# Patient Record
Sex: FEMALE | Race: WHITE | NOT HISPANIC OR LATINO
[De-identification: names, ages, dates, MRNs, and addresses within clinical notes are randomized per-mention and may not be internally consistent; named-entity substitution may affect disease eponyms.]

---

## 2023-06-21 PROBLEM — Z00.00 ENCOUNTER FOR PREVENTIVE HEALTH EXAMINATION: Status: ACTIVE | Noted: 2023-06-21

## 2023-06-27 ENCOUNTER — APPOINTMENT (OUTPATIENT)
Dept: NEUROSURGERY | Facility: CLINIC | Age: 43
End: 2023-06-27
Payer: SELF-PAY

## 2023-06-27 PROCEDURE — EDU01: CPT

## 2023-06-29 NOTE — HISTORY OF PRESENT ILLNESS
[de-identified] : Ms. TOBAR is a pleasant 43 year old female who presents today with a chief complaint of RIGHT ear pulsatile tinnitus.  It first started in Fall of 2022.  Since that time it has been getting slightly better.  It is described as a constant, whooshing sound that is in sync with her pulse.  Pressing on the bone behind her right ear stops the sound.  There is no change with ipsilateral neck pressure\par \par She had an LP last month with an opening pressure of 18cm H2O.\par \par She denies headaches, blurry or double vision.  She had a normal eye exam in the last couple of months.  \par \par She has been seen at [facility] and worked up for IIH as above.  She was offered stenting for pulsatile tinnitus and is here for a second opinion.

## 2023-06-29 NOTE — REASON FOR VISIT
[Home] : at home, [unfilled] , at the time of the educational consult. [Medical Office: ([facility])___] : at the medical office located in  [Participant] : the participant [Self] : self [New Patient Visit] : a new patient visit [FreeTextEntry1] : Pulsatile Tinnitus

## 2023-06-29 NOTE — ASSESSMENT
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  She has not had an MRA to r/o dural AVF, which there is concern for based on resolution of the sound with occipital artery pressure.  I recommend MRA Head to r/o dural AVF before making final recommendations.  \par \par Recommendations:\par MRA Head w/wo TRICKS PROTOCOL\par Follow Up with Me After the Above

## 2023-07-11 ENCOUNTER — TRANSCRIPTION ENCOUNTER (OUTPATIENT)
Age: 43
End: 2023-07-11

## 2023-08-31 ENCOUNTER — NON-APPOINTMENT (OUTPATIENT)
Age: 43
End: 2023-08-31

## 2023-08-31 DIAGNOSIS — H93.A9 PULSATILE TINNITUS, UNSPECIFIED EAR: ICD-10-CM

## 2023-08-31 NOTE — ASSESSMENT
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  The risks, benefits and alternatives of endovascular treatment were discussed with the patient in detail. In my personal experience, the risks are very rare, but the possibility is not zero. Risks include stroke, brain hemorrhage, any type of disability (facial or extremity weakness, facial or extremity numbness, speech difficulties, blindness) and death. There are also possible complications related to the incisions such as infection, pain, swelling and bleeding.   Recommendations: Schedule Dural AV Fistula Embolization  **Dr. Gordon reviewed the imaging and discussed results and recommendations with the patient via video call**

## 2023-08-31 NOTE — HISTORY OF PRESENT ILLNESS
[de-identified] : Ms. TOBAR is a pleasant 43 year old female who presents today for follow up of RIGHT ear pulsatile tinnitus and MRA review.  It first started in Fall of 2022.  Since that time it has been getting slightly better.  It is described as a constant, whooshing sound that is in sync with her pulse.  Pressing on the bone behind her right ear stops the sound.  There is no change with ipsilateral neck pressure  She had an LP last month with an opening pressure of 18cm H2O.  She denies headaches, blurry or double vision.  She had a normal eye exam in the last couple of months.    She has been seen at [facility] and worked up for IIH as above.  She was offered stenting for pulsatile tinnitus and is here for a second opinion.

## 2023-10-23 ENCOUNTER — APPOINTMENT (OUTPATIENT)
Dept: NEUROSURGERY | Facility: HOSPITAL | Age: 43
End: 2023-10-23

## 2023-11-06 ENCOUNTER — NON-APPOINTMENT (OUTPATIENT)
Age: 43
End: 2023-11-06

## 2023-11-07 ENCOUNTER — APPOINTMENT (OUTPATIENT)
Dept: NEUROSURGERY | Facility: CLINIC | Age: 43
End: 2023-11-07
Payer: COMMERCIAL

## 2023-11-07 VITALS
SYSTOLIC BLOOD PRESSURE: 110 MMHG | BODY MASS INDEX: 29.89 KG/M2 | HEART RATE: 98 BPM | HEIGHT: 66 IN | DIASTOLIC BLOOD PRESSURE: 78 MMHG | OXYGEN SATURATION: 98 % | WEIGHT: 186 LBS

## 2023-11-07 DIAGNOSIS — Z86.59 PERSONAL HISTORY OF OTHER MENTAL AND BEHAVIORAL DISORDERS: ICD-10-CM

## 2023-11-07 PROCEDURE — 99203 OFFICE O/P NEW LOW 30 MIN: CPT

## 2023-11-07 RX ORDER — CLONAZEPAM 2 MG/1
TABLET ORAL
Refills: 0 | Status: ACTIVE | COMMUNITY

## 2023-11-07 RX ORDER — MULTIVITAMIN
TABLET ORAL
Refills: 0 | Status: ACTIVE | COMMUNITY

## 2023-11-07 RX ORDER — SERTRALINE HYDROCHLORIDE 25 MG/1
TABLET, FILM COATED ORAL
Refills: 0 | Status: ACTIVE | COMMUNITY

## 2023-11-08 ENCOUNTER — INPATIENT (INPATIENT)
Facility: HOSPITAL | Age: 43
LOS: 0 days | Discharge: HOME | DRG: 27 | End: 2023-11-09
Attending: RADIOLOGY | Admitting: RADIOLOGY
Payer: COMMERCIAL

## 2023-11-08 ENCOUNTER — APPOINTMENT (OUTPATIENT)
Dept: NEUROSURGERY | Facility: HOSPITAL | Age: 43
End: 2023-11-08

## 2023-11-08 VITALS
WEIGHT: 186.07 LBS | DIASTOLIC BLOOD PRESSURE: 71 MMHG | TEMPERATURE: 98 F | RESPIRATION RATE: 16 BRPM | SYSTOLIC BLOOD PRESSURE: 104 MMHG | HEART RATE: 96 BPM | HEIGHT: 66 IN | OXYGEN SATURATION: 97 %

## 2023-11-08 DIAGNOSIS — H93.A9 PULSATILE TINNITUS, UNSPECIFIED EAR: ICD-10-CM

## 2023-11-08 LAB
ANION GAP SERPL CALC-SCNC: 13 MMOL/L — SIGNIFICANT CHANGE UP (ref 5–17)
ANION GAP SERPL CALC-SCNC: 13 MMOL/L — SIGNIFICANT CHANGE UP (ref 5–17)
BUN SERPL-MCNC: 12 MG/DL — SIGNIFICANT CHANGE UP (ref 7–23)
BUN SERPL-MCNC: 12 MG/DL — SIGNIFICANT CHANGE UP (ref 7–23)
CALCIUM SERPL-MCNC: 7.3 MG/DL — LOW (ref 8.4–10.5)
CALCIUM SERPL-MCNC: 7.3 MG/DL — LOW (ref 8.4–10.5)
CHLORIDE SERPL-SCNC: 111 MMOL/L — HIGH (ref 96–108)
CHLORIDE SERPL-SCNC: 111 MMOL/L — HIGH (ref 96–108)
CO2 SERPL-SCNC: 17 MMOL/L — LOW (ref 22–31)
CO2 SERPL-SCNC: 17 MMOL/L — LOW (ref 22–31)
CREAT SERPL-MCNC: 0.55 MG/DL — SIGNIFICANT CHANGE UP (ref 0.5–1.3)
CREAT SERPL-MCNC: 0.55 MG/DL — SIGNIFICANT CHANGE UP (ref 0.5–1.3)
EGFR: 116 ML/MIN/1.73M2 — SIGNIFICANT CHANGE UP
EGFR: 116 ML/MIN/1.73M2 — SIGNIFICANT CHANGE UP
GLUCOSE SERPL-MCNC: 98 MG/DL — SIGNIFICANT CHANGE UP (ref 70–99)
GLUCOSE SERPL-MCNC: 98 MG/DL — SIGNIFICANT CHANGE UP (ref 70–99)
HCT VFR BLD CALC: 42.7 % — SIGNIFICANT CHANGE UP (ref 34.5–45)
HCT VFR BLD CALC: 42.7 % — SIGNIFICANT CHANGE UP (ref 34.5–45)
HGB BLD-MCNC: 14 G/DL — SIGNIFICANT CHANGE UP (ref 11.5–15.5)
HGB BLD-MCNC: 14 G/DL — SIGNIFICANT CHANGE UP (ref 11.5–15.5)
MAGNESIUM SERPL-MCNC: 1.9 MG/DL — SIGNIFICANT CHANGE UP (ref 1.6–2.6)
MAGNESIUM SERPL-MCNC: 1.9 MG/DL — SIGNIFICANT CHANGE UP (ref 1.6–2.6)
MCHC RBC-ENTMCNC: 30 PG — SIGNIFICANT CHANGE UP (ref 27–34)
MCHC RBC-ENTMCNC: 30 PG — SIGNIFICANT CHANGE UP (ref 27–34)
MCHC RBC-ENTMCNC: 32.8 GM/DL — SIGNIFICANT CHANGE UP (ref 32–36)
MCHC RBC-ENTMCNC: 32.8 GM/DL — SIGNIFICANT CHANGE UP (ref 32–36)
MCV RBC AUTO: 91.6 FL — SIGNIFICANT CHANGE UP (ref 80–100)
MCV RBC AUTO: 91.6 FL — SIGNIFICANT CHANGE UP (ref 80–100)
NRBC # BLD: 0 /100 WBCS — SIGNIFICANT CHANGE UP (ref 0–0)
NRBC # BLD: 0 /100 WBCS — SIGNIFICANT CHANGE UP (ref 0–0)
PHOSPHATE SERPL-MCNC: 2.4 MG/DL — LOW (ref 2.5–4.5)
PHOSPHATE SERPL-MCNC: 2.4 MG/DL — LOW (ref 2.5–4.5)
PLATELET # BLD AUTO: 289 K/UL — SIGNIFICANT CHANGE UP (ref 150–400)
PLATELET # BLD AUTO: 289 K/UL — SIGNIFICANT CHANGE UP (ref 150–400)
POTASSIUM SERPL-MCNC: 3.9 MMOL/L — SIGNIFICANT CHANGE UP (ref 3.5–5.3)
POTASSIUM SERPL-MCNC: 3.9 MMOL/L — SIGNIFICANT CHANGE UP (ref 3.5–5.3)
POTASSIUM SERPL-SCNC: 3.9 MMOL/L — SIGNIFICANT CHANGE UP (ref 3.5–5.3)
POTASSIUM SERPL-SCNC: 3.9 MMOL/L — SIGNIFICANT CHANGE UP (ref 3.5–5.3)
RBC # BLD: 4.66 M/UL — SIGNIFICANT CHANGE UP (ref 3.8–5.2)
RBC # BLD: 4.66 M/UL — SIGNIFICANT CHANGE UP (ref 3.8–5.2)
RBC # FLD: 12.3 % — SIGNIFICANT CHANGE UP (ref 10.3–14.5)
RBC # FLD: 12.3 % — SIGNIFICANT CHANGE UP (ref 10.3–14.5)
SODIUM SERPL-SCNC: 141 MMOL/L — SIGNIFICANT CHANGE UP (ref 135–145)
SODIUM SERPL-SCNC: 141 MMOL/L — SIGNIFICANT CHANGE UP (ref 135–145)
WBC # BLD: 12.67 K/UL — HIGH (ref 3.8–10.5)
WBC # BLD: 12.67 K/UL — HIGH (ref 3.8–10.5)
WBC # FLD AUTO: 12.67 K/UL — HIGH (ref 3.8–10.5)
WBC # FLD AUTO: 12.67 K/UL — HIGH (ref 3.8–10.5)

## 2023-11-08 PROCEDURE — 36227 PLACE CATH XTRNL CAROTID: CPT | Mod: 50

## 2023-11-08 PROCEDURE — 61624 TCAT PERM OCCLS/EMBOLJ CNS: CPT

## 2023-11-08 PROCEDURE — 36224 PLACE CATH CAROTD ART: CPT | Mod: 26

## 2023-11-08 PROCEDURE — 36226 PLACE CATH VERTEBRAL ART: CPT | Mod: RT

## 2023-11-08 PROCEDURE — 93970 EXTREMITY STUDY: CPT | Mod: 26

## 2023-11-08 PROCEDURE — 99232 SBSQ HOSP IP/OBS MODERATE 35: CPT

## 2023-11-08 PROCEDURE — 75898 FOLLOW-UP ANGIOGRAPHY: CPT | Mod: 26

## 2023-11-08 PROCEDURE — 75894 X-RAYS TRANSCATH THERAPY: CPT | Mod: 26

## 2023-11-08 RX ORDER — SODIUM CHLORIDE 9 MG/ML
500 INJECTION INTRAMUSCULAR; INTRAVENOUS; SUBCUTANEOUS ONCE
Refills: 0 | Status: COMPLETED | OUTPATIENT
Start: 2023-11-08 | End: 2023-11-08

## 2023-11-08 RX ORDER — FAMOTIDINE 10 MG/ML
20 INJECTION INTRAVENOUS DAILY
Refills: 0 | Status: DISCONTINUED | OUTPATIENT
Start: 2023-11-08 | End: 2023-11-09

## 2023-11-08 RX ORDER — MAGNESIUM SULFATE 500 MG/ML
1 VIAL (ML) INJECTION ONCE
Refills: 0 | Status: COMPLETED | OUTPATIENT
Start: 2023-11-08 | End: 2023-11-08

## 2023-11-08 RX ORDER — SODIUM,POTASSIUM PHOSPHATES 278-250MG
1 POWDER IN PACKET (EA) ORAL ONCE
Refills: 0 | Status: COMPLETED | OUTPATIENT
Start: 2023-11-08 | End: 2023-11-08

## 2023-11-08 RX ORDER — SODIUM CHLORIDE 9 MG/ML
1000 INJECTION INTRAMUSCULAR; INTRAVENOUS; SUBCUTANEOUS
Refills: 0 | Status: DISCONTINUED | OUTPATIENT
Start: 2023-11-08 | End: 2023-11-08

## 2023-11-08 RX ORDER — POTASSIUM CHLORIDE 20 MEQ
40 PACKET (EA) ORAL ONCE
Refills: 0 | Status: COMPLETED | OUTPATIENT
Start: 2023-11-08 | End: 2023-11-08

## 2023-11-08 RX ORDER — ASPIRIN/CALCIUM CARB/MAGNESIUM 324 MG
81 TABLET ORAL DAILY
Refills: 0 | Status: COMPLETED | OUTPATIENT
Start: 2023-11-08 | End: 2023-11-08

## 2023-11-08 RX ORDER — MAGNESIUM SULFATE 500 MG/ML
1 VIAL (ML) INJECTION ONCE
Refills: 0 | Status: COMPLETED | OUTPATIENT
Start: 2023-11-08 | End: 2023-11-09

## 2023-11-08 RX ORDER — ASPIRIN/CALCIUM CARB/MAGNESIUM 324 MG
1 TABLET ORAL
Refills: 0 | DISCHARGE

## 2023-11-08 RX ORDER — CHLORHEXIDINE GLUCONATE 213 G/1000ML
1 SOLUTION TOPICAL DAILY
Refills: 0 | Status: DISCONTINUED | OUTPATIENT
Start: 2023-11-08 | End: 2023-11-09

## 2023-11-08 RX ORDER — ACETAMINOPHEN 500 MG
650 TABLET ORAL EVERY 6 HOURS
Refills: 0 | Status: DISCONTINUED | OUTPATIENT
Start: 2023-11-08 | End: 2023-11-09

## 2023-11-08 RX ORDER — SENNA PLUS 8.6 MG/1
2 TABLET ORAL AT BEDTIME
Refills: 0 | Status: DISCONTINUED | OUTPATIENT
Start: 2023-11-08 | End: 2023-11-09

## 2023-11-08 RX ORDER — ESCITALOPRAM OXALATE 10 MG/1
10 TABLET, FILM COATED ORAL DAILY
Refills: 0 | Status: DISCONTINUED | OUTPATIENT
Start: 2023-11-08 | End: 2023-11-09

## 2023-11-08 RX ORDER — POLYETHYLENE GLYCOL 3350 17 G/17G
17 POWDER, FOR SOLUTION ORAL AT BEDTIME
Refills: 0 | Status: DISCONTINUED | OUTPATIENT
Start: 2023-11-08 | End: 2023-11-09

## 2023-11-08 RX ORDER — CALCIUM GLUCONATE 100 MG/ML
2 VIAL (ML) INTRAVENOUS ONCE
Refills: 0 | Status: COMPLETED | OUTPATIENT
Start: 2023-11-08 | End: 2023-11-08

## 2023-11-08 RX ORDER — DEXAMETHASONE 0.5 MG/5ML
4 ELIXIR ORAL EVERY 6 HOURS
Refills: 0 | Status: DISCONTINUED | OUTPATIENT
Start: 2023-11-08 | End: 2023-11-09

## 2023-11-08 RX ORDER — ESCITALOPRAM OXALATE 10 MG/1
1 TABLET, FILM COATED ORAL
Refills: 0 | DISCHARGE

## 2023-11-08 RX ORDER — POTASSIUM PHOSPHATE, MONOBASIC POTASSIUM PHOSPHATE, DIBASIC 236; 224 MG/ML; MG/ML
30 INJECTION, SOLUTION INTRAVENOUS ONCE
Refills: 0 | Status: COMPLETED | OUTPATIENT
Start: 2023-11-08 | End: 2023-11-09

## 2023-11-08 RX ORDER — ASPIRIN/CALCIUM CARB/MAGNESIUM 324 MG
81 TABLET ORAL
Refills: 0 | Status: DISCONTINUED | OUTPATIENT
Start: 2023-11-09 | End: 2023-11-09

## 2023-11-08 RX ORDER — ASPIRIN/CALCIUM CARB/MAGNESIUM 324 MG
81 TABLET ORAL DAILY
Refills: 0 | Status: DISCONTINUED | OUTPATIENT
Start: 2023-11-08 | End: 2023-11-08

## 2023-11-08 RX ORDER — OXYCODONE HYDROCHLORIDE 5 MG/1
10 TABLET ORAL EVERY 4 HOURS
Refills: 0 | Status: DISCONTINUED | OUTPATIENT
Start: 2023-11-08 | End: 2023-11-09

## 2023-11-08 RX ORDER — OXYCODONE HYDROCHLORIDE 5 MG/1
5 TABLET ORAL EVERY 4 HOURS
Refills: 0 | Status: DISCONTINUED | OUTPATIENT
Start: 2023-11-08 | End: 2023-11-09

## 2023-11-08 RX ADMIN — SODIUM CHLORIDE 70 MILLILITER(S): 9 INJECTION INTRAMUSCULAR; INTRAVENOUS; SUBCUTANEOUS at 12:56

## 2023-11-08 RX ADMIN — Medication 81 MILLIGRAM(S): at 13:51

## 2023-11-08 RX ADMIN — Medication 102 GRAM(S): at 15:55

## 2023-11-08 RX ADMIN — OXYCODONE HYDROCHLORIDE 5 MILLIGRAM(S): 5 TABLET ORAL at 14:21

## 2023-11-08 RX ADMIN — SENNA PLUS 2 TABLET(S): 8.6 TABLET ORAL at 22:17

## 2023-11-08 RX ADMIN — ESCITALOPRAM OXALATE 10 MILLIGRAM(S): 10 TABLET, FILM COATED ORAL at 19:52

## 2023-11-08 RX ADMIN — Medication 1 PACKET(S): at 15:39

## 2023-11-08 RX ADMIN — Medication 4 MILLIGRAM(S): at 23:47

## 2023-11-08 RX ADMIN — OXYCODONE HYDROCHLORIDE 5 MILLIGRAM(S): 5 TABLET ORAL at 13:51

## 2023-11-08 RX ADMIN — SODIUM CHLORIDE 1000 MILLILITER(S): 9 INJECTION INTRAMUSCULAR; INTRAVENOUS; SUBCUTANEOUS at 12:56

## 2023-11-08 RX ADMIN — OXYCODONE HYDROCHLORIDE 5 MILLIGRAM(S): 5 TABLET ORAL at 19:50

## 2023-11-08 RX ADMIN — Medication 4 MILLIGRAM(S): at 17:21

## 2023-11-08 RX ADMIN — Medication 40 MILLIEQUIVALENT(S): at 23:56

## 2023-11-08 RX ADMIN — POLYETHYLENE GLYCOL 3350 17 GRAM(S): 17 POWDER, FOR SOLUTION ORAL at 22:17

## 2023-11-08 RX ADMIN — OXYCODONE HYDROCHLORIDE 5 MILLIGRAM(S): 5 TABLET ORAL at 20:20

## 2023-11-08 RX ADMIN — Medication 200 GRAM(S): at 23:49

## 2023-11-08 NOTE — CHART NOTE - NSCHARTNOTEFT_GEN_A_CORE
Interventional Neuro Radiology  Pre-Procedure Note PA-C    This is a 43 year old right hand dominant female            Allergies:   PMHX:  PSHX:   Social History:   FAMILY HISTORY:  Current Medications:     Labs:                   Blood Bank:       Assessment/Plan:   This is a 43 year old right hand dominant female  presents with   Patient presents to neuro-IR for selective cerebral angiography.   Procedure, goals, risks, benefits and alternatives  were discussed with patient and patient's . (Buddy) All questions were answered. Risks include but are not limited to stroke, vessel injury, hemorrhage, and or right groin hematoma. Patient demonstrates understanding of all risks involved with this procedure and wishes to continue. Appropriate consent was obtained from patient and consent is in the patient's chart. Interventional Neuro Radiology  Pre-Procedure Note PA-C    This is a 43 year old right hand dominant female            Allergies:   PMHX: left ankle fracture  PSHX:   Social History:   FAMILY HISTORY:  Current Medications:     CBC:          12.8  8.83  37.2   347    141  103   15   4.3   22    0.71  113      Blood Bank:       Assessment/Plan:   This is a 43 year old right hand dominant female   Patient presents to neuro-IR for selective cerebral angiography.   Procedure, goals, risks, benefits and alternatives  were discussed with patient and patient's . (Buddy) All questions were answered. Risks include but are not limited to stroke, vessel injury, hemorrhage, and or right groin hematoma. Patient demonstrates understanding of all risks involved with this procedure and wishes to continue. Appropriate consent was obtained from patient and consent is in the patient's chart. Interventional Neuro Radiology  Pre-Procedure Note PA-C    This is a 43 year old right hand dominant female with complaints of right ear tinnitus, who sought Dr Gordon. MRA/MRV revealed a dural fistula. Patient presents to Neuro IR for a catheter cerebral venogram, angiogram, and embolization of fistula, accompanied by her .     Upon exam patient is A + O x 3 , speech is fluent, recent and remote memory intact, follows commands, motor moves with good strength, fracture of left ankle for 1 month, patient ambulates with boot.     Allergies: NKDA   PMHX: Right ear tinnitus. left ankle fracture  PSHX:   Social History: , non-tobacco smoker   FAMILY HISTORY: Non-contributory   Current Medications: ASA 81mg     CBC:          12.8  8.83  37.2   347    141  103   15   4.3   22    0.71  113      Blood Bank: O negative available     Assessment/Plan:   This is a 43 year old right hand dominant female with a dural fistula, who presents to Neuro IR for a catheter cerebral venogram and angiogram, and embolization of dural fistula. Procedure, goals, risks, benefits and alternatives were discussed with patient and patient's . (Buddy) All questions were answered. Risks include but are not limited to stroke, vessel injury, hemorrhage, and or right groin hematoma. Patient demonstrates understanding of all risks involved with this procedure and wishes to continue. Appropriate consent was obtained from patient and consent is in the patient's chart.

## 2023-11-08 NOTE — CHART NOTE - NSCHARTNOTEFT_GEN_A_CORE
CAPRINI SCORE [CLOT] Score on Admission for     AGE RELATED RISK FACTORS                                                       MOBILITY RELATED FACTORS  [x] Age 41-60 years                                            (1 Point)                  [ ] Bed rest                                                        (1 Point)  [ ] Age: 61-74 years                                           (2 Points)                 [ ] Plaster cast                                                   (2 Points)  [ ] Age= 75 years                                              (3 Points)                 [ ] Bed bound for more than 72 hours                 (2 Points)    DISEASE RELATED RISK FACTORS                                               GENDER SPECIFIC FACTORS  [ ] Edema in the lower extremities                       (1 Point)                  [ ] Pregnancy                                                     (1 Point)  [ ] Varicose veins                                               (1 Point)                  [ ] Post-partum < 6 weeks                                   (1 Point)             [x ] BMI > 25 Kg/m2                                            (1 Point)                  [ ] Hormonal therapy  or oral contraception          (1 Point)                 [ ] Sepsis (in the previous month)                        (1 Point)                  [ ] History of pregnancy complications                 (1 point)  [ ] Pneumonia or serious lung disease                                               [ ] Unexplained or recurrent                     (1 Point)           (in the previous month)                               (1 Point)  [ ] Abnormal pulmonary function test                     (1 Point)                 SURGERY RELATED RISK FACTORS (include planned surgeries)  [ ] Acute myocardial infarction                              (1 Point)                 [ ]  Section                                             (1 Point)  [ ] Congestive heart failure (in the previous month)  (1 Point)         [ ] Minor surgery                                                  (1 Point)   [ ] Inflammatory bowel disease                             (1 Point)                 [ ] Arthroscopic surgery                                        (2 Points)  [ ] Central venous access                                      (2 Points)                [ x] General surgery lasting more than 45 minutes   (2 Points)       [ ] Stroke (in the previous month)                          (5 Points)               [ ] Elective arthroplasty                                         (5 Points)            [ ] current or past malignancy                              (2 Points)                                                                                                       HEMATOLOGY RELATED FACTORS                                                 TRAUMA RELATED RISK FACTORS  [ ] Prior episodes of VTE                                     (3 Points)                [ ] Fracture of the hip, pelvis, or leg                       (5 Points)  [ ] Positive family history for VTE                         (3 Points)                 [ ] Acute spinal cord injury (in the previous month)  (5 Points)  [ ] Prothrombin 05433 A                                     (3 Points)                 [ ] Paralysis  (less than 1 month)                             (5 Points)  [ ] Factor V Leiden                                             (3 Points)                  [ ] Multiple Trauma within 1 month                        (5 Points)  [ ] Lupus anticoagulants                                     (3 Points)                                                           [ ] Anticardiolipin antibodies                               (3 Points)                                                       [ ] High homocysteine in the blood                      (3 Points)                                             [ ] Other congenital or acquired thrombophilia      (3 Points)                                                [ ] Heparin induced thrombocytopenia                  (3 Points)                                          Total Score [    4      ]    Risk:  Very low 0   Low 1 to 2   Moderate 3 to 4   High =5       VTE Prophylasix Recommednations:  [ x] mechanical pneumatic compression devices                                      [ ] contraindicated: _____________________  [ ] chemo prophylasix                                                                                   [ ] contraindicated _____________________    **** HIGH LIKELIHOOD DVT PRESENT ON ADMISSION  [ ] (please order LE dopplers within 24 hours of admission)

## 2023-11-08 NOTE — PRE-ANESTHESIA EVALUATION ADULT - NSANTHADDINFOFT_GEN_ALL_CORE
Risks and benefits of anesthesia discussed with patient - including nausea/vomiting, sore throat, dental injury, and cardiopulmonary complications. All questions were answered.

## 2023-11-08 NOTE — PROGRESS NOTE ADULT - ASSESSMENT
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 PT tolerated procedure well, was hemodynamically stable and had no change in neurological status. PT admitted to NSCU for neurologic monitoring. PT doing well, no focal deficits, C/O 6-7/10 right-sided headache.    NEURO:  Q1h neuro checks  Assess femoral incision site  Keep patient flat for 4 hours  Dexamethasone 4mg q6h  Acetaminophen PO for pain control  Oxycodone IR for pain control PRN for moderate or severe pain    CV:  Not on pressors, normotensive    PULM:  On RA  Incentive spirometry    GI:  Normal diet once PT able to sit up, passes dysphagia screen  Bowel regimen w/ Miralax & Senna    :  mIVF @70cc/hr - D/C once patient able to sit up  D/C santillan once patient able to sit up    HEME:  Aspirin 81mg PO qDay  SCDs    ENDOCRINE:  Euglycemic    PSYCH:  Initiate home Escitalopram    ICU:  Head of bed elevated after 4h  Pain control as per above    DISPO:  Home tomorrow    HEME/ONC:  VTE prophylaxis: [x] SCDs [] chemoprophylaxis [x] hold chemoprophylaxis due to: due to post op day 0    ID:  Afebrile  No ABX requested by surgeon    SOCIAL/FAMILY:  [] awaiting [x] updated at bedside [] family meeting    CODE STATUS:  [x] Full Code [] DNR [] DNI [] Palliative/Comfort Care    DISPOSITION:  [x] ICU [] Stroke Unit [] FLOOR [] EMU [] RCU [] PCU   42 YO 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 PT tolerated procedure well, was hemodynamically stable and had no change in neurological status. PT admitted to NSCU for neurologic monitoring. PT doing well, no focal deficits, C/O 6-7/10 right-sided headache.    NEURO:  Q1h neuro checks  Assess femoral incision site  Dexamethasone 4mg q6h  Acetaminophen PO for pain control  Oxycodone IR for pain control PRN for moderate or severe pain  PT to see    CV:  -140  A line     PULM:  On RA  Incentive spirometry    GI:  Normal diet once PT able to sit up, passes dysphagia screen  Bowel regimen w/ Miralax & Senna    :  IVL  D/C santillan once patient able to sit up    HEME:  Aspirin 81mg PO qDay  SCDs    ENDOCRINE:  Euglycemic    PSYCH:  Initiate home Escitalopram    ICU:  Head of bed elevated after 4h  Pain control as per above    DISPO:  Home tomorrow    HEME/ONC:  VTE prophylaxis: [x] SCDs [] chemoprophylaxis [x] hold chemoprophylaxis due to: due to post op day 0    ID:  Afebrile  No ABX requested by surgeon    SOCIAL/FAMILY:  [] awaiting [x] updated at bedside [] family meeting    CODE STATUS:  [x] Full Code [] DNR [] DNI [] Palliative/Comfort Care    DISPOSITION:  [x] ICU [] Stroke Unit [] FLOOR [] EMU [] RCU [] PCU

## 2023-11-08 NOTE — PATIENT PROFILE ADULT - FALL HARM RISK - RISK INTERVENTIONS

## 2023-11-08 NOTE — PROGRESS NOTE ADULT - ASSESSMENT
42 YO 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 PT tolerated procedure well, was hemodynamically stable and had no change in neurological status. PT admitted to NSCU for neurologic monitoring. PT doing well, no focal deficits, C/O 6-7/10 right-sided headache.    NEURO:  Q1h neuro checks  Assess femoral incision site  Keep patient flat for 4 hours  Dexamethasone 4mg q6h  Acetaminophen PO for pain control  Oxycodone IR for pain control PRN for moderate or severe pain    CV:  Not on pressors, normotensive    PULM:  On RA  Incentive spirometry    GI:  Normal diet once PT able to sit up, passes dysphagia screen  Bowel regimen w/ Miralax & Senna    :  mIVF @70cc/hr - D/C once patient able to sit up  D/C santillan once patient able to sit up    HEME:  Aspirin 81mg PO qDay  SCDs    ENDOCRINE:  Euglycemic    PSYCH:  Initiate home Escitalopram    ICU:  Head of bed elevated after 4h  Pain control as per above    DISPO:  Home tomorrow    FULL CODE STATUS     44 YO 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 PT tolerated procedure well, was hemodynamically stable and had no change in neurological status. PT admitted to NSCU for neurologic monitoring. PT doing well, no focal deficits, C/O 6-7/10 right-sided headache.    NEURO:  Q1h neuro checks  Assess femoral incision site  Keep patient flat for 4 hours  Dexamethasone 4mg q6h  Acetaminophen PO for pain control  Oxycodone IR for pain control PRN for moderate or severe pain    CV:  Not on pressors, normotensive    PULM:  On RA  Incentive spirometry    GI:  Normal diet once PT able to sit up, passes dysphagia screen  Bowel regimen w/ Miralax & Senna    :  mIVF @70cc/hr - D/C once patient able to sit up  D/C santillan once patient able to sit up    HEME:  Aspirin 81mg PO qDay  SCDs    ENDOCRINE:  Euglycemic    PSYCH:  Initiate home Escitalopram    ICU:  Head of bed elevated after 4h  Pain control as per above    DISPO:  Home tomorrow    HEME/ONC:  VTE prophylaxis: [x] SCDs [] chemoprophylaxis [x] hold chemoprophylaxis due to: due to post op day 0    ID:  Afebrile  No ABX requested by surgeon    SOCIAL/FAMILY:  [] awaiting [x] updated at bedside [] family meeting    CODE STATUS:  [x] Full Code [] DNR [] DNI [] Palliative/Comfort Care    DISPOSITION:  [x] ICU [] Stroke Unit [] FLOOR [] EMU [] RCU [] PCU

## 2023-11-08 NOTE — PROGRESS NOTE ADULT - SUBJECTIVE AND OBJECTIVE BOX
HOSPITAL COURSE: Pt is a 44 YO F w/ a pmhx of cerebrovascular dural fistula, pulsatile tinnitus POD 0 S/P femoral vein catheter cerebral angio, venogram and emoblization of fistula. PT tolerated procedure well, was hemodynamically stable and had no change in neurological status. PT admitted to NSCU for neurologic monitoring,    24 hour Events:   post op admitted to nscu     Allergies: No Known Allergies      REVIEW OF SYSTEMS: [ ] Unable to Assess due to neurologic exam   [x] All ROS addressed below are non-contributory, except:  Neuro: [x] Headache [ ] Back pain [ ] Numbness [ ] Weakness [ ] Ataxia [ ] Dizziness [ ] Aphasia [ ] Dysarthria [ ] Visual disturbance  Resp: [ ] Shortness of breath/dyspnea, [ ] Orthopnea [ ] Cough  CV: [ ] Chest pain [ ] Palpitation [ ] Lightheadedness [ ] Syncope  Renal: [ ] Thirst [ ] Edema  GI: [ ] Nausea [ ] Emesis [ ] Abdominal pain [ ] Constipation [ ] Diarrhea  Hem: [ ] Hematemesis [ ] bright red blood per rectum  ID: [ ] Fever [ ] Chills [ ] Dysuria  ENT: [ ] Rhinorrhea      DEVICES:   [ ] Restraints [ ] ET tube [ ] central line [x] right rad arterial line [x] santillan [] NGT/OGT [ ] EVD [ ] LD [ ] LISA/HMV [ ] Trach [ ] PEG [ ] Chest Tube     VITALS:   Vital Signs Last 24 Hrs  T(C): 36.4 (08 Nov 2023 08:30), Max: 36.4 (08 Nov 2023 08:28)  T(F): 97.5 (08 Nov 2023 08:28), Max: 97.5 (08 Nov 2023 08:28)  HR: 96 (08 Nov 2023 08:30) (96 - 96)  BP: 104/71 (08 Nov 2023 08:30) (104/71 - 104/71)  BP(mean): --  RR: 16 (08 Nov 2023 08:30) (16 - 16)  SpO2: 97% (08 Nov 2023 08:30) (97% - 97%)    CAPILLARY BLOOD GLUCOSE    I&O's Summary    Respiratory:    IVF FLUIDS/MEDICATIONS:   MEDICATIONS  (STANDING):  aspirin  chewable 81 milliGRAM(s) Oral daily  chlorhexidine 4% Liquid 1 Application(s) Topical daily  escitalopram 10 milliGRAM(s) Oral daily  multivitamin 1 Tablet(s) Oral daily  polyethylene glycol 3350 17 Gram(s) Oral at bedtime  senna 2 Tablet(s) Oral at bedtime  sodium chloride 0.9%. 1000 milliLiter(s) (70 mL/Hr) IV Continuous <Continuous>    MEDICATIONS  (PRN):  acetaminophen     Tablet .. 650 milliGRAM(s) Oral every 6 hours PRN Temp greater or equal to 38.5C (101.3F), Mild Pain (1 - 3)  oxyCODONE    IR 10 milliGRAM(s) Oral every 4 hours PRN Severe Pain (7 - 10)  oxyCODONE    IR 5 milliGRAM(s) Oral every 4 hours PRN Moderate Pain (4 - 6)    IMAGING:      EXAMINATION:  PHYSICAL EXAM:    Constitutional: No Acute Distress    Neurological: Lethargic but arousable, alert oriented to person, place and time, Following Commands, PERRL, EOMI, 5/5 strength on all 4 extremities, neglecting right side and left side in tact.     Pulmonary: Clear to Auscultation, No rales, No rhonchi, No wheezes     Cardiovascular: S1, S2, Regular rate and rhythm     Gastrointestinal: Soft, Non-tender, Non-distended     Extremities: No calf tenderness     Incision: head wrap in place, pin site in forehead     HOSPITAL COURSE: 43 YO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 PT tolerated procedure well, was hemodynamically stable and had no change in neurological status. PT admitted to NSCU for neurologic monitoring.    24 hour Events:   post op admitted to NSCU    Allergies: No Known Allergies      REVIEW OF SYSTEMS: [ ] Unable to Assess due to neurologic exam   [x] All ROS addressed below are non-contributory, except:  Neuro: [x] Headache [ ] Back pain [ ] Numbness [ ] Weakness [ ] Ataxia [ ] Dizziness [ ] Aphasia [ ] Dysarthria [ ] Visual disturbance  Resp: [ ] Shortness of breath/dyspnea, [ ] Orthopnea [ ] Cough  CV: [ ] Chest pain [ ] Palpitation [ ] Lightheadedness [ ] Syncope  Renal: [ ] Thirst [ ] Edema  GI: [ ] Nausea [ ] Emesis [ ] Abdominal pain [ ] Constipation [ ] Diarrhea  Hem: [ ] Hematemesis [ ] bright red blood per rectum  ID: [ ] Fever [ ] Chills [ ] Dysuria  ENT: [ ] Rhinorrhea      DEVICES:   [ ] Restraints [ ] ET tube [ ] central line [] arterial line [x] santillan [] NGT/OGT [ ] EVD [ ] LD [ ] LISA/HMV [ ] Trach [ ] PEG [ ] Chest Tube     VITALS:   Vital Signs Last 24 Hrs  T(C): 36.4 (08 Nov 2023 08:30), Max: 36.4 (08 Nov 2023 08:28)  T(F): 97.5 (08 Nov 2023 08:28), Max: 97.5 (08 Nov 2023 08:28)  HR: 96 (08 Nov 2023 08:30) (96 - 96)  BP: 104/71 (08 Nov 2023 08:30) (104/71 - 104/71)  BP(mean): --  RR: 16 (08 Nov 2023 08:30) (16 - 16)  SpO2: 97% (08 Nov 2023 08:30) (97% - 97%)    CAPILLARY BLOOD GLUCOSE    I&O's Summary    08 Nov 2023 07:01  -  08 Nov 2023 13:29  --------------------------------------------------------  IN: 640 mL / OUT: 125 mL / NET: 515 mL    Respiratory:  On RA    IVF FLUIDS/MEDICATIONS:   MEDICATIONS  (STANDING):  aspirin  chewable 81 milliGRAM(s) Oral daily  chlorhexidine 4% Liquid 1 Application(s) Topical daily  escitalopram 10 milliGRAM(s) Oral daily  multivitamin 1 Tablet(s) Oral daily  polyethylene glycol 3350 17 Gram(s) Oral at bedtime  senna 2 Tablet(s) Oral at bedtime  sodium chloride 0.9%. 1000 milliLiter(s) (70 mL/Hr) IV Continuous <Continuous>    MEDICATIONS  (PRN):  acetaminophen     Tablet .. 650 milliGRAM(s) Oral every 6 hours PRN Temp greater or equal to 38.5C (101.3F), Mild Pain (1 - 3)  oxyCODONE    IR 10 milliGRAM(s) Oral every 4 hours PRN Severe Pain (7 - 10)  oxyCODONE    IR 5 milliGRAM(s) Oral every 4 hours PRN Moderate Pain (4 - 6)    IMAGING:    EXAMINATION:  PHYSICAL EXAM:    Constitutional: No Acute Distress    Neurological: AOx4, following Commands, PERRL, EOMI, 5/5 strength on all 4 extremities, sensation to light touch intact bilaterally, no neglect, hearing intact bilaterally, no C/O tinnitus    Pulmonary: Clear to auscultation, no rales, no rhonchi, no wheezes     Cardiovascular: S1, S2, Regular rate and rhythm     Gastrointestinal: Soft, Non-tender, Non-distended     Extremities: No calf tenderness     Incision: incision at right groin dressed w/ quick clot & safeguard dressing; non-tenderm non-errythematous

## 2023-11-08 NOTE — PROGRESS NOTE ADULT - SUBJECTIVE AND OBJECTIVE BOX
HOSPITAL COURSE: 43 YO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 PT tolerated procedure well, was hemodynamically stable and had no change in neurological status. PT admitted to NSCU for neurologic monitoring.    24 hour Events:   post op admitted to NSCU    Allergies: No Known Allergies      REVIEW OF SYSTEMS: [ ] Unable to Assess due to neurologic exam   [x] All ROS addressed below are non-contributory, except:  Neuro: [x] Headache [ ] Back pain [ ] Numbness [ ] Weakness [ ] Ataxia [ ] Dizziness [ ] Aphasia [ ] Dysarthria [ ] Visual disturbance  Resp: [ ] Shortness of breath/dyspnea, [ ] Orthopnea [ ] Cough  CV: [ ] Chest pain [ ] Palpitation [ ] Lightheadedness [ ] Syncope  Renal: [ ] Thirst [ ] Edema  GI: [ ] Nausea [ ] Emesis [ ] Abdominal pain [ ] Constipation [ ] Diarrhea  Hem: [ ] Hematemesis [ ] bright red blood per rectum  ID: [ ] Fever [ ] Chills [ ] Dysuria  ENT: [ ] Rhinorrhea      DEVICES:   [ ] Restraints [ ] ET tube [ ] central line [] arterial line [x] santillan [] NGT/OGT [ ] EVD [ ] LD [ ] LISA/HMV [ ] Trach [ ] PEG [ ] Chest Tube     VITALS:   Vital Signs Last 24 Hrs  T(C): 36.4 (08 Nov 2023 08:30), Max: 36.4 (08 Nov 2023 08:28)  T(F): 97.5 (08 Nov 2023 08:28), Max: 97.5 (08 Nov 2023 08:28)  HR: 96 (08 Nov 2023 08:30) (96 - 96)  BP: 104/71 (08 Nov 2023 08:30) (104/71 - 104/71)  BP(mean): --  RR: 16 (08 Nov 2023 08:30) (16 - 16)  SpO2: 97% (08 Nov 2023 08:30) (97% - 97%)    CAPILLARY BLOOD GLUCOSE    I&O's Summary    08 Nov 2023 07:01  -  08 Nov 2023 13:29  --------------------------------------------------------  IN: 640 mL / OUT: 125 mL / NET: 515 mL    Respiratory:  On RA    IVF FLUIDS/MEDICATIONS:   MEDICATIONS  (STANDING):  aspirin  chewable 81 milliGRAM(s) Oral daily  chlorhexidine 4% Liquid 1 Application(s) Topical daily  escitalopram 10 milliGRAM(s) Oral daily  multivitamin 1 Tablet(s) Oral daily  polyethylene glycol 3350 17 Gram(s) Oral at bedtime  senna 2 Tablet(s) Oral at bedtime  sodium chloride 0.9%. 1000 milliLiter(s) (70 mL/Hr) IV Continuous <Continuous>    MEDICATIONS  (PRN):  acetaminophen     Tablet .. 650 milliGRAM(s) Oral every 6 hours PRN Temp greater or equal to 38.5C (101.3F), Mild Pain (1 - 3)  oxyCODONE    IR 10 milliGRAM(s) Oral every 4 hours PRN Severe Pain (7 - 10)  oxyCODONE    IR 5 milliGRAM(s) Oral every 4 hours PRN Moderate Pain (4 - 6)    IMAGING:    EXAMINATION:  PHYSICAL EXAM:    Constitutional: No Acute Distress    Neurological: AOx4, following Commands, PERRL, EOMI, 5/5 strength on all 4 extremities, sensation to light touch intact bilaterally, no neglect, hearing intact bilaterally, no C/O tinnitus    Pulmonary: Clear to auscultation, no rales, no rhonchi, no wheezes     Cardiovascular: S1, S2, Regular rate and rhythm     Gastrointestinal: Soft, Non-tender, Non-distended     Extremities: No calf tenderness     Incision: incision at right groin dressed w/ quick clot & safeguard dressing; non-tenderm non-errythematous     HOSPITAL COURSE: 43 YO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 PT tolerated procedure well, was hemodynamically stable and had no change in neurological status. PT admitted to NSCU for neurologic monitoring.    24 hour Events:   post op admitted to NSCU    Allergies: No Known Allergies      REVIEW OF SYSTEMS: [ ] Unable to Assess due to neurologic exam   [x] All ROS addressed below are non-contributory, except:  Neuro: [x] Headache [ ] Back pain [ ] Numbness [ ] Weakness [ ] Ataxia [ ] Dizziness [ ] Aphasia [ ] Dysarthria [ ] Visual disturbance  Resp: [ ] Shortness of breath/dyspnea, [ ] Orthopnea [ ] Cough  CV: [ ] Chest pain [ ] Palpitation [ ] Lightheadedness [ ] Syncope  Renal: [ ] Thirst [ ] Edema  GI: [ ] Nausea [ ] Emesis [ ] Abdominal pain [ ] Constipation [ ] Diarrhea  Hem: [ ] Hematemesis [ ] bright red blood per rectum  ID: [ ] Fever [ ] Chills [ ] Dysuria  ENT: [ ] Rhinorrhea      DEVICES:   [ ] Restraints [ ] ET tube [ ] central line [X] arterial line [] santillan [] NGT/OGT [ ] EVD [ ] LD [ ] LISA/HMV [ ] Trach [ ] PEG [ ] Chest Tube     VITALS:   Vital Signs Last 24 Hrs  T(C): 36.4 (2023 08:30), Max: 36.4 (2023 08:28)  T(F): 97.5 (2023 08:28), Max: 97.5 (2023 08:28)  HR: 96 (2023 08:30) (96 - 96)  BP: 104/71 (2023 08:30) (104/71 - 104/71)  BP(mean): --  RR: 16 (2023 08:30) (16 - 16)  SpO2: 97% (2023 08:30) (97% - 97%)    CAPILLARY BLOOD GLUCOSE    I&O's Summary    2023 07:01  -  2023 13:29  --------------------------------------------------------  IN: 640 mL / OUT: 125 mL / NET: 515 mL    Respiratory:  On RA    IVF FLUIDS/MEDICATIONS:   MEDICATIONS  (STANDING):  aspirin  chewable 81 milliGRAM(s) Oral daily  chlorhexidine 4% Liquid 1 Application(s) Topical daily  escitalopram 10 milliGRAM(s) Oral daily  multivitamin 1 Tablet(s) Oral daily  polyethylene glycol 3350 17 Gram(s) Oral at bedtime  senna 2 Tablet(s) Oral at bedtime  sodium chloride 0.9%. 1000 milliLiter(s) (70 mL/Hr) IV Continuous <Continuous>    MEDICATIONS  (PRN):  acetaminophen     Tablet .. 650 milliGRAM(s) Oral every 6 hours PRN Temp greater or equal to 38.5C (101.3F), Mild Pain (1 - 3)  oxyCODONE    IR 10 milliGRAM(s) Oral every 4 hours PRN Severe Pain (7 - 10)  oxyCODONE    IR 5 milliGRAM(s) Oral every 4 hours PRN Moderate Pain (4 - 6)    IMAGIN/8 LED- negative     EXAMINATION:  PHYSICAL EXAM:    Constitutional: No Acute Distress    Neurological: AOx4, following Commands, PERRL, EOMI, 5/5 strength on all 4 extremities, sensation to light touch intact bilaterally, no neglect, hearing intact bilaterally, no C/O tinnitus    Pulmonary: Clear to auscultation, no rales, no rhonchi, no wheezes     Cardiovascular: S1, S2, Regular rate and rhythm     Gastrointestinal: Soft, Non-tender, Non-distended     Extremities: No calf tenderness     Incision: incision at right groin dressed w/ quick clot & safeguard dressing; non-tenderm non-errythematous

## 2023-11-08 NOTE — PRE-ANESTHESIA EVALUATION ADULT - HEIGHT IN CM
----- Message from Jermain Saez sent at 8/3/2022 10:06 AM EDT -----  Subject: Appointment Request    Reason for Call: New Patient/New to Provider Appointment needed: New   Patient Request Appointment    QUESTIONS    Reason for appointment request? No appointments available during search     Additional Information for Provider? PT SISTER ZAHIDA WOULD LIKE FOR HER   BROTHER TO BECOME ESTABLISED WITH DR. Marilynn Mills. PT HAS DOWN SYNDROME AND WAS   HIGHLY RECOMMENDED.  ZAHIDA WOULD LIKE A CALL BACK.   ---------------------------------------------------------------------------  --------------  Joellen Siu [facility]  [phone]; OK to leave message on voicemail  ---------------------------------------------------------------------------  --------------  SCRIPT ANSWERS  COVID Screen: Dev Munguia [phone]

## 2023-11-08 NOTE — PATIENT PROFILE ADULT - NSPROPTRIGHTBILLOFRIGHTS_GEN_A_NUR
patient Skin Substitute Paste Text: The defect edges were debeveled with a #15 scalpel blade.  Given the location of the defect, shape of the defect and the proximity to free margins a skin substitute micronized graft was deemed most appropriate.  The entire vial contents were admixed with 0.5ccs of sterile saline, formed into a paste and then evenly spread over the entire wound bed.

## 2023-11-09 ENCOUNTER — TRANSCRIPTION ENCOUNTER (OUTPATIENT)
Age: 43
End: 2023-11-09

## 2023-11-09 VITALS — DIASTOLIC BLOOD PRESSURE: 64 MMHG | HEART RATE: 88 BPM | SYSTOLIC BLOOD PRESSURE: 104 MMHG

## 2023-11-09 PROCEDURE — 75898 FOLLOW-UP ANGIOGRAPHY: CPT

## 2023-11-09 PROCEDURE — 80048 BASIC METABOLIC PNL TOTAL CA: CPT

## 2023-11-09 PROCEDURE — 85027 COMPLETE CBC AUTOMATED: CPT

## 2023-11-09 PROCEDURE — C1760: CPT

## 2023-11-09 PROCEDURE — 99231 SBSQ HOSP IP/OBS SF/LOW 25: CPT

## 2023-11-09 PROCEDURE — 75894 X-RAYS TRANSCATH THERAPY: CPT

## 2023-11-09 PROCEDURE — 36226 PLACE CATH VERTEBRAL ART: CPT

## 2023-11-09 PROCEDURE — 61624 TCAT PERM OCCLS/EMBOLJ CNS: CPT

## 2023-11-09 PROCEDURE — 86900 BLOOD TYPING SEROLOGIC ABO: CPT

## 2023-11-09 PROCEDURE — 83735 ASSAY OF MAGNESIUM: CPT

## 2023-11-09 PROCEDURE — 86901 BLOOD TYPING SEROLOGIC RH(D): CPT

## 2023-11-09 PROCEDURE — C2628: CPT

## 2023-11-09 PROCEDURE — C9399: CPT

## 2023-11-09 PROCEDURE — 93970 EXTREMITY STUDY: CPT

## 2023-11-09 PROCEDURE — 36227 PLACE CATH XTRNL CAROTID: CPT

## 2023-11-09 PROCEDURE — 84100 ASSAY OF PHOSPHORUS: CPT

## 2023-11-09 PROCEDURE — C1887: CPT

## 2023-11-09 PROCEDURE — C1894: CPT

## 2023-11-09 PROCEDURE — 36224 PLACE CATH CAROTD ART: CPT

## 2023-11-09 PROCEDURE — C1889: CPT

## 2023-11-09 PROCEDURE — 86850 RBC ANTIBODY SCREEN: CPT

## 2023-11-09 PROCEDURE — C1769: CPT

## 2023-11-09 RX ORDER — SODIUM CHLORIDE 9 MG/ML
500 INJECTION INTRAMUSCULAR; INTRAVENOUS; SUBCUTANEOUS ONCE
Refills: 0 | Status: COMPLETED | OUTPATIENT
Start: 2023-11-09 | End: 2023-11-09

## 2023-11-09 RX ORDER — ACETAMINOPHEN 500 MG
2 TABLET ORAL
Qty: 0 | Refills: 0 | DISCHARGE
Start: 2023-11-09

## 2023-11-09 RX ORDER — DEXAMETHASONE 0.5 MG/5ML
4 ELIXIR ORAL EVERY 6 HOURS
Refills: 0 | Status: DISCONTINUED | OUTPATIENT
Start: 2023-11-09 | End: 2023-11-09

## 2023-11-09 RX ORDER — POLYETHYLENE GLYCOL 3350 17 G/17G
17 POWDER, FOR SOLUTION ORAL
Qty: 0 | Refills: 0 | DISCHARGE
Start: 2023-11-09

## 2023-11-09 RX ORDER — FAMOTIDINE 10 MG/ML
1 INJECTION INTRAVENOUS
Qty: 10 | Refills: 0
Start: 2023-11-09 | End: 2023-11-18

## 2023-11-09 RX ORDER — OXYCODONE HYDROCHLORIDE 5 MG/1
1 TABLET ORAL
Qty: 15 | Refills: 0
Start: 2023-11-09 | End: 2023-11-13

## 2023-11-09 RX ORDER — FAMOTIDINE 10 MG/ML
20 INJECTION INTRAVENOUS DAILY
Refills: 0 | Status: DISCONTINUED | OUTPATIENT
Start: 2023-11-09 | End: 2023-11-09

## 2023-11-09 RX ORDER — SENNA PLUS 8.6 MG/1
2 TABLET ORAL
Qty: 0 | Refills: 0 | DISCHARGE
Start: 2023-11-09

## 2023-11-09 RX ADMIN — Medication 81 MILLIGRAM(S): at 06:10

## 2023-11-09 RX ADMIN — OXYCODONE HYDROCHLORIDE 10 MILLIGRAM(S): 5 TABLET ORAL at 00:24

## 2023-11-09 RX ADMIN — Medication 4 MILLIGRAM(S): at 06:02

## 2023-11-09 RX ADMIN — Medication 4 MILLIGRAM(S): at 11:13

## 2023-11-09 RX ADMIN — POTASSIUM PHOSPHATE, MONOBASIC POTASSIUM PHOSPHATE, DIBASIC 83.33 MILLIMOLE(S): 236; 224 INJECTION, SOLUTION INTRAVENOUS at 02:00

## 2023-11-09 RX ADMIN — Medication 100 GRAM(S): at 00:25

## 2023-11-09 RX ADMIN — FAMOTIDINE 20 MILLIGRAM(S): 10 INJECTION INTRAVENOUS at 11:13

## 2023-11-09 RX ADMIN — SODIUM CHLORIDE 1000 MILLILITER(S): 9 INJECTION INTRAMUSCULAR; INTRAVENOUS; SUBCUTANEOUS at 06:13

## 2023-11-09 RX ADMIN — Medication 1 TABLET(S): at 11:13

## 2023-11-09 RX ADMIN — OXYCODONE HYDROCHLORIDE 5 MILLIGRAM(S): 5 TABLET ORAL at 06:15

## 2023-11-09 RX ADMIN — OXYCODONE HYDROCHLORIDE 10 MILLIGRAM(S): 5 TABLET ORAL at 00:54

## 2023-11-09 RX ADMIN — OXYCODONE HYDROCHLORIDE 5 MILLIGRAM(S): 5 TABLET ORAL at 06:45

## 2023-11-09 NOTE — DISCHARGE NOTE PROVIDER - HOSPITAL COURSE
This is a 43 year old right hand dominant female with complaints of right ear tinnitus, who sought Dr Gordon. MRA/MRV revealed a dural fistula. Patient presents to Neuro IR for a catheter cerebral venogram, angiogram, and embolization of fistula, accompanied by her .   On 11/8 patient had embolization of her Venous sinus thrombosis. Neurologically she has been stable. She is neurologically stable for discharge home today. She will see Dr Gordon on 11/10 for follow up

## 2023-11-09 NOTE — PROGRESS NOTE ADULT - SUBJECTIVE AND OBJECTIVE BOX
Patient seen and examined at bedside.    --Anticoagulation--  aspirin  chewable 81 milliGRAM(s) Oral <User Schedule>    T(C): 37 (11-08-23 @ 23:00), Max: 37 (11-08-23 @ 23:00)  HR: 81 (11-09-23 @ 01:00) (72 - 105)  BP: 96/55 (11-09-23 @ 01:00) (90/58 - 104/71)  RR: 16 (11-09-23 @ 01:00) (12 - 20)  SpO2: 95% (11-09-23 @ 01:00) (94% - 100%)  Wt(kg): --    Exam: AOx3, EOMI, no facial, no drift. LLE casted due to broken ankle, LLE 4/5 as a result of this. BUE and RLE 5/5. Right groin slightly tender but soft. No more tinnitus

## 2023-11-09 NOTE — PROGRESS NOTE ADULT - ASSESSMENT
42 YO 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 PT tolerated procedure well, was hemodynamically stable and had no change in neurological status. PT admitted to NSCU for neurologic monitoring. PT doing well, no focal deficits, C/O 6-7/10 right-sided headache.    NEURO:  Q1h neuro checks  Assess femoral incision site  Dexamethasone 4mg q6h  Acetaminophen PO for pain control  Oxycodone IR for pain control PRN for moderate or severe pain  PT to see    CV:  -140  A line     PULM:  On RA  Incentive spirometry    GI:  Normal diet once PT able to sit up, passes dysphagia screen  Bowel regimen w/ Miralax & Senna    :  IVL  D/C santillan once patient able to sit up    HEME:  Aspirin 81mg PO qDay  SCDs    ENDOCRINE:  Euglycemic    PSYCH:  Initiate home Escitalopram    ICU:  Head of bed elevated after 4h  Pain control as per above    DISPO:  Home tomorrow    HEME/ONC:  VTE prophylaxis: [x] SCDs [] chemoprophylaxis [x] hold chemoprophylaxis due to: due to post op day 0    ID:  Afebrile  No ABX requested by surgeon    SOCIAL/FAMILY:  [] awaiting [x] updated at bedside [] family meeting    CODE STATUS:  [x] Full Code [] DNR [] DNI [] Palliative/Comfort Care    DISPOSITION:  [x] ICU [] Stroke Unit [] FLOOR [] EMU [] RCU [] PCU   44 YO 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 PT tolerated procedure well, was hemodynamically stable and had no change in neurological status. PT admitted to NSCU for neurologic monitoring. PT doing well, no focal deficits, C/O 6-7/10 right-sided headache.    NEURO:  Q4h neuro checks  Dexamethasone 4mg q6h  Acetaminophen PO for pain control  PT to see    CV:  -140  A line     PULM:  On RA  Incentive spirometry    GI:  Normal diet once PT able to sit up, passes dysphagia screen  Bowel regimen w/ Miralax & Senna    :  IVL  D/C santillan once patient able to sit up    HEME:  Aspirin 81mg PO qDay  SCDs    ENDOCRINE:  Euglycemic    PSYCH:  Initiate home Escitalopram    ICU:  Pain control as per above    DISPO:  Home THIS AM    HEME/ONC:  VTE prophylaxis: [x] SCDs [] chemoprophylaxis [x] hold chemoprophylaxis due to: due to post op day 0    ID:  Afebrile  No ABX requested by surgeon    SOCIAL/FAMILY:  [] awaiting [x] updated at bedside [] family meeting    CODE STATUS:  [x] Full Code [] DNR [] DNI [] Palliative/Comfort Care    DISPOSITION:  [x] ICU [] Stroke Unit [] FLOOR [] EMU [] RCU [] PCU

## 2023-11-09 NOTE — PROGRESS NOTE ADULT - SUBJECTIVE AND OBJECTIVE BOX
HOSPITAL COURSE: 43 YO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 PT tolerated procedure well, was hemodynamically stable and had no change in neurological status. PT admitted to NSCU for neurologic monitoring.    24 hour Events:   post op admitted to NSCU  11/9- No events overnight, patient's examination remains intact.    Allergies: No Known Allergies      Allergies    No Known Allergies    Intolerances        REVIEW OF SYSTEMS: [ ] Unable to Assess due to neurologic exam   [x] All ROS addressed below are non-contributory, except:  Neuro: [ ] Headache [ ] Back pain [ ] Numbness [ ] Weakness [ ] Ataxia [ ] Dizziness [ ] Aphasia [ ] Dysarthria [ ] Visual disturbance  Resp: [ ] Shortness of breath/dyspnea, [ ] Orthopnea [ ] Cough  CV: [ ] Chest pain [ ] Palpitation [ ] Lightheadedness [ ] Syncope  Renal: [ ] Thirst [ ] Edema  GI: [ ] Nausea [ ] Emesis [ ] Abdominal pain [ ] Constipation [ ] Diarrhea  Hem: [ ] Hematemesis [ ] bright red blood per rectum  ID: [ ] Fever [ ] Chills [ ] Dysuria  ENT: [ ] Rhinorrhea      DEVICES:   [ ] Restraints [ ] ET tube [ ] central line [ ] arterial line [ ] santillan [ ] NGT/OGT [ ] EVD [ ] LD [ ] LISA/HMV [ ] Trach [ ] PEG [ ] Chest Tube     VITALS:   Vital Signs Last 24 Hrs  T(C): 37 (09 Nov 2023 03:00), Max: 37 (08 Nov 2023 23:00)  T(F): 98.6 (09 Nov 2023 03:00), Max: 98.6 (08 Nov 2023 23:00)  HR: 86 (09 Nov 2023 06:00) (72 - 105)  BP: 94/55 (09 Nov 2023 06:00) (90/58 - 104/71)  BP(mean): 64 (09 Nov 2023 06:00) (58 - 77)  RR: 16 (09 Nov 2023 06:00) (12 - 20)  SpO2: 95% (09 Nov 2023 06:00) (92% - 100%)    Parameters below as of 08 Nov 2023 19:00  Patient On (Oxygen Delivery Method): room air      CAPILLARY BLOOD GLUCOSE        I&O's Summary    08 Nov 2023 07:01  -  09 Nov 2023 07:00  --------------------------------------------------------  IN: 3110 mL / OUT: 3370 mL / NET: -260 mL        Respiratory:        LABS:                        14.0   12.67 )-----------( 289      ( 08 Nov 2023 13:14 )             42.7     11-08    141  |  111<H>  |  12  ----------------------------<  98  3.9   |  17<L>  |  0.55             MEDICATION LEVELS:     IVF FLUIDS/MEDICATIONS:   MEDICATIONS  (STANDING):  aspirin  chewable 81 milliGRAM(s) Oral <User Schedule>  chlorhexidine 4% Liquid 1 Application(s) Topical daily  dexAMETHasone  Injectable 4 milliGRAM(s) IV Push every 6 hours  escitalopram 10 milliGRAM(s) Oral daily  famotidine Injectable 20 milliGRAM(s) IV Push daily  multivitamin 1 Tablet(s) Oral daily  polyethylene glycol 3350 17 Gram(s) Oral at bedtime  senna 2 Tablet(s) Oral at bedtime    MEDICATIONS  (PRN):  acetaminophen     Tablet .. 650 milliGRAM(s) Oral every 6 hours PRN Temp greater or equal to 38.5C (101.3F), Mild Pain (1 - 3)  oxyCODONE    IR 10 milliGRAM(s) Oral every 4 hours PRN Severe Pain (7 - 10)  oxyCODONE    IR 5 milliGRAM(s) Oral every 4 hours PRN Moderate Pain (4 - 6)      EXAMINATION:  PHYSICAL EXAM:    Constitutional: No Acute Distress    Neurological: AOx4, following Commands, PERRL, EOMI, 5/5 strength on all 4 extremities, sensation to light touch intact bilaterally, no neglect, hearing intact bilaterally, no C/O tinnitus. LL ankle in splint- Fractured but movements in other joints intact on the left extremity    Pulmonary: Clear to auscultation, no rales, no rhonchi, no wheezes     Cardiovascular: S1, S2, Regular rate and rhythm     Gastrointestinal: Soft, Non-tender, Non-distended     Extremities: No calf tenderness     Incision: incision at right groin non-tenderm non-draining

## 2023-11-09 NOTE — DISCHARGE NOTE PROVIDER - NSDCMRMEDTOKEN_GEN_ALL_CORE_FT
acetaminophen 325 mg oral tablet: 2 tab(s) orally every 6 hours As needed Temp greater or equal to 38.5C (101.3F), Mild Pain (1 - 3)  aspirin 81 mg oral capsule: 1 cap(s) orally  Lexapro 10 mg oral tablet: 1 tab(s) orally  Multiple Vitamins oral tablet: 1 tab(s) orally  polyethylene glycol 3350 oral powder for reconstitution: 17 gram(s) orally once a day (at bedtime)  senna leaf extract oral tablet: 2 tab(s) orally once a day (at bedtime)   acetaminophen 325 mg oral tablet: 2 tab(s) orally every 6 hours As needed Temp greater or equal to 38.5C (101.3F), Mild Pain (1 - 3)  aspirin 81 mg oral capsule: 1 cap(s) orally  Lexapro 10 mg oral tablet: 1 tab(s) orally  Medrol 4 mg oral tablet: 1 tab(s) orally every 6 hours AS DIRECTED  Multiple Vitamins oral tablet: 1 tab(s) orally  oxyCODONE 5 mg oral tablet: 1 tab(s) orally 3 times a day MDD: 3  Pepcid 20 mg oral tablet: 1 tab(s) orally once a day  polyethylene glycol 3350 oral powder for reconstitution: 17 gram(s) orally once a day (at bedtime)  senna leaf extract oral tablet: 2 tab(s) orally once a day (at bedtime)

## 2023-11-09 NOTE — DISCHARGE NOTE PROVIDER - NSDCFUSCHEDAPPT_GEN_ALL_CORE_FT
Laura Mariscal  [facility] Physician [facility]  NEUROSURG [address] [place] [address]  Scheduled Appointment: 11/10/2023

## 2023-11-09 NOTE — DISCHARGE NOTE PROVIDER - NSDCCPTREATMENT_GEN_ALL_CORE_FT
PRINCIPAL PROCEDURE  Procedure: Angiogram, cerebral, with embolization  Findings and Treatment: Of Venous sinus Thrombosis

## 2023-11-09 NOTE — DISCHARGE NOTE NURSING/CASE MANAGEMENT/SOCIAL WORK - NSDCPEFALRISK_GEN_ALL_CORE
For information on Fall & Injury Prevention, visit: [email or web address] OR  [email or web address] OR  https://www.cdc.gov/steadi/patient.html

## 2023-11-09 NOTE — PHYSICAL THERAPY INITIAL EVALUATION ADULT - PERTINENT HX OF CURRENT PROBLEM, REHAB EVAL
Pt is a 44 YO F admitted to [facility] on 11/8/23 w/ pmhx of cerebrovascular dural fistula, pulsatile tinnitus POD 0 S/P femoral vein catheter cerebral angio, venogram and embolization of fistula. Pt tolerated procedure well, was hemodynamically stable and had no change in neurological status. PT admitted to NSCU for neurologic monitoring. PT doing well, no focal deficits, C/O 6-7/10 right-sided headache. Hospital course: -140, S/p Right sigmoid sinus dAVF embolization on 11/8/2023 (-) BLE duplex.

## 2023-11-09 NOTE — DISCHARGE NOTE NURSING/CASE MANAGEMENT/SOCIAL WORK - PATIENT PORTAL LINK FT
You can access the FollowMyHealth Patient Portal offered by [facility] by registering at the following website: [email or web address]. By joining [facility]’s FollowMyHealth portal, you will also be able to view your health information using other applications (apps) compatible with our system.

## 2023-11-09 NOTE — PROGRESS NOTE ADULT - SUBJECTIVE AND OBJECTIVE BOX
Neuro Interventional Radiology SARAH  Post procedure note        INTERVAL HPI OVERNIGHT EVENTS:  43 year old right hand dominant female s/p __ seen lying comfortably in bed. Tolerating diet. Passing gas/BM. Voiding. Brown in with __ clear urine. Denies headache, weakness, numbness, n/v/d, fevers, chills, chest pain, SOB.     Vital Signs Last 24 Hrs  T(C): 37 (09 Nov 2023 03:00), Max: 37 (08 Nov 2023 23:00)  T(F): 98.6 (09 Nov 2023 03:00), Max: 98.6 (08 Nov 2023 23:00)  HR: 86 (09 Nov 2023 06:00) (72 - 105)  BP: 94/55 (09 Nov 2023 06:00) (90/58 - 104/71)  BP(mean): 64 (09 Nov 2023 06:00) (58 - 77)  RR: 16 (09 Nov 2023 06:00) (12 - 20)  SpO2: 95% (09 Nov 2023 06:00) (92% - 100%)    Parameters below as of 08 Nov 2023 19:00  Patient On (Oxygen Delivery Method): room air        PHYSICAL EXAM:  GENERAL: NAD, well-groomed, well-developed  HEAD:  Atraumatic, normocephalic  WOUND: Right groin soft, no Hematoma formation   DARYL COMA S Appropriately conversant without aphasia/Nonverbal; following simple commands/mimicking/not following commands  CRANIAL NERVES: PERRL. EOMI without nystagmus. Facial sensation intact V1-3 distribution b/l. Face symmetric w/ normal eye closure and smile, tongue midline. Hearing grossly intact. Speech clear. Head turning and shoulder shrug intact.   REFLEXES: No pronator drift; DTRs 2+ intact and symmetric; negative Wilson's b/l; negative clonus b/l  MOTOR: strength 5/5 b/l upper and lower extremities  SENSATION: grossly intact to light touch all extremities  COORDINATION: Rapid alternating movements intact  HEART: +S1/+S2; Regular rate and rhythm; no murmurs, rubs, or gallops  ABDOMEN: Soft, nontender, nondistended; bowel sounds present all four quadrants  EXTREMITIES:  2+ peripheral pulses, no clubbing, cyanosis, or edema  SKIN: Warm, dry; no rashes or lesions    LABS:                        14.0   12.67 )-----------( 289      ( 08 Nov 2023 13:14 )             42.7     11-08    141  |  111<H>  |  12  ----------------------------<  98  3.9   |  17<L>  |  0.55    Ca    7.3<L>      08 Nov 2023 13:14  Phos  2.4     11-08  Mg     1.9     11-08        Urinalysis Basic - ( 08 Nov 2023 13:14 )    Color: x / Appearance: x / SG: x / pH: x  Gluc: 98 mg/dL / Ketone: x  / Bili: x / Urobili: x   Blood: x / Protein: x / Nitrite: x   Leuk Esterase: x / RBC: x / WBC x   Sq Epi: x / Non Sq Epi: x / Bacteria: x        11-08 @ 07:01  -  11-09 @ 07:00  --------------------------------------------------------  IN: 3110 mL / OUT: 3370 mL / NET: -260 mL        RADIOLOGY & ADDITIONAL TESTS: Neuro Interventional Radiology PAKARLIE  Post procedure note        INTERVAL HPI OVERNIGHT EVENTS:  43 year old right hand dominant female s/p __ seen lying comfortably in bed. Tolerating diet. Passing gas/BM. Voiding. Brown in with __ clear urine. Denies headache, weakness, numbness, n/v/d, fevers, chills, chest pain, SOB.     Vital Signs Last 24 Hrs  T(C): 37 (09 Nov 2023 03:00), Max: 37 (08 Nov 2023 23:00)  T(F): 98.6 (09 Nov 2023 03:00), Max: 98.6 (08 Nov 2023 23:00)  HR: 86 (09 Nov 2023 06:00) (72 - 105)  BP: 94/55 (09 Nov 2023 06:00) (90/58 - 104/71)  BP(mean): 64 (09 Nov 2023 06:00) (58 - 77)  RR: 16 (09 Nov 2023 06:00) (12 - 20)  SpO2: 95% (09 Nov 2023 06:00) (92% - 100%)    Parameters below as of 08 Nov 2023 19:00  Patient On (Oxygen Delivery Method): room air        PHYSICAL EXAM:  GENERAL: NAD, well-groomed, well-developed  HEAD:  Atraumatic, normocephalic  WOUND: Right groin soft, no Hematoma formation   DARYL COMA S Appropriately conversant, following simple commands  CRANIAL NERVES: PERRL. EOMI without nystagmus. Facial sensation intact V1-3 distribution b/l. Face symmetric w/ normal eye closure and smile, tongue midline. Hearing grossly intact. Speech clear. Head turning and shoulder shrug intact.   REFLEXES: No pronator drift  MOTOR: strength 5/5 b/l upper and lower extremities  SENSATION: grossly intact to light touch all extremities  COORDINATION: Rapid alternating movements intact  ABDOMEN: Soft, nontender, nondistended  EXTREMITIES:  2+ peripheral pulses  SKIN: Warm, dry; no rashes or lesions    LABS:                        14.0   12.67 )-----------( 289      ( 08 Nov 2023 13:14 )             42.7     11-08    141  |  111<H>  |  12  ----------------------------<  98  3.9   |  17<L>  |  0.55    Ca    7.3<L>      08 Nov 2023 13:14  Phos  2.4     11-08  Mg     1.9     11-08        Urinalysis Basic - ( 08 Nov 2023 13:14 )    Color: x / Appearance: x / SG: x / pH: x  Gluc: 98 mg/dL / Ketone: x  / Bili: x / Urobili: x   Blood: x / Protein: x / Nitrite: x   Leuk Esterase: x / RBC: x / WBC x   Sq Epi: x / Non Sq Epi: x / Bacteria: x        11-08 @ 07:01  -  11-09 @ 07:00  --------------------------------------------------------  IN: 3110 mL / OUT: 3370 mL / NET: -260 mL        RADIOLOGY & ADDITIONAL TESTS:

## 2023-11-09 NOTE — DISCHARGE NOTE PROVIDER - NSDCCPCAREPLAN_GEN_ALL_CORE_FT
PRINCIPAL DISCHARGE DIAGNOSIS  Diagnosis: H/O idiopathic cerebral venous sinus thrombosis  Assessment and Plan of Treatment: You had embolization of your venous sinus thrombosis . You are stable to be discharge home today on Medrol dose aris and pain medications for heaaches. Please see Dr Gordon in the office on 11/10 for follow up      SECONDARY DISCHARGE DIAGNOSES  Diagnosis: H/O depression headache  Assessment and Plan of Treatment: Please continue Lexapro as previously and follow up with your primary care physician    Diagnosis: On famotidine prophylaxis  Assessment and Plan of Treatment: Please continue while on Medrol dose aris

## 2023-11-09 NOTE — PROGRESS NOTE ADULT - ASSESSMENT
-S/p Right sigmoid sinus dAVF embolization on 11/8/2023  -Plan for D/c Home in AM with Medrol Dosepak

## 2023-11-09 NOTE — DISCHARGE NOTE PROVIDER - NSDCFUADDINST_GEN_ALL_CORE_FT
Please keep Rt groin site clean and dry, pat dry after showering, and do not use any creams or ointments to incision.  Please do not engage in strenuous activity, heavy lifting, drive, or return to work or school until cleared by surgeon.  Please notify physician if fevers, bleeding, swelling, pain not relieved by medication, increased sluggishness or irritability, increased nausea or vomiting, inability to tolerate foods or liquids.  No heavy lifting/straining, Showering allowed, Stairs allowed, Walking - Indoors allowed, Walking - Outdoors allowed, Follow Instructions Provided by your Surgical Team

## 2023-11-09 NOTE — PHARMACOTHERAPY INTERVENTION NOTE - COMMENTS
Spoke with patient at bedside about discharge medications. Review to take Medrol Dose Pack as directed and to take it with food and snacks to avoid stomach irritation. Patient to take famotidine (Pepcid) to prevent stomach irration from the medrol dose pack and acid reflux. Patient to take oxycodone as needed for pain, and to keep away from children. Monitor for allergic reactions and shortness of breath to call 911. Patient stated she took oxycodone in the hospital without reaction. Patient to monitor for constipation and to take stool softener and laxatives as needed if she develops constipation from taking oxycodone. All questions answered.

## 2023-11-10 ENCOUNTER — APPOINTMENT (OUTPATIENT)
Dept: NEUROSURGERY | Facility: CLINIC | Age: 43
End: 2023-11-10
Payer: COMMERCIAL

## 2023-11-10 VITALS
HEART RATE: 88 BPM | OXYGEN SATURATION: 97 % | DIASTOLIC BLOOD PRESSURE: 78 MMHG | WEIGHT: 186 LBS | HEIGHT: 66 IN | SYSTOLIC BLOOD PRESSURE: 117 MMHG | BODY MASS INDEX: 29.89 KG/M2

## 2023-11-10 DIAGNOSIS — Z78.9 OTHER SPECIFIED HEALTH STATUS: ICD-10-CM

## 2023-11-10 PROCEDURE — 99213 OFFICE O/P EST LOW 20 MIN: CPT

## 2023-11-17 PROBLEM — Z78.9 OTHER SPECIFIED HEALTH STATUS: Chronic | Status: ACTIVE | Noted: 2023-11-08

## 2024-05-14 ENCOUNTER — APPOINTMENT (OUTPATIENT)
Dept: NEUROSURGERY | Facility: CLINIC | Age: 44
End: 2024-05-14

## 2024-05-21 ENCOUNTER — APPOINTMENT (OUTPATIENT)
Dept: NEUROSURGERY | Facility: CLINIC | Age: 44
End: 2024-05-21
Payer: COMMERCIAL

## 2024-05-21 DIAGNOSIS — I67.1 CEREBRAL ANEURYSM, NONRUPTURED: ICD-10-CM

## 2024-05-21 PROCEDURE — 99213 OFFICE O/P EST LOW 20 MIN: CPT

## 2024-05-21 NOTE — HISTORY OF PRESENT ILLNESS
[FreeTextEntry1] : Ms. 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  The dural AVF was initially found during work up for pulsatile tinnitus.  Since the procedure, her pulsatile tinnitus has completely resolved.  She states that her brain fog is much better as well.      Denies dizziness, visual scintillations, episodes of visual loss or blurring, diplopia, hearing changes, tinnitus, speech problems, swallowing difficulties, numbness, tingling, weakness, tremors, twitches, seizures, imbalance or coordination difficulties

## 2024-05-21 NOTE — REASON FOR VISIT
[Home] : at home, [unfilled] , at the time of the visit. [Medical Office: ([facility])___] : at the medical office located in  [Patient] : the patient [Self] : self [Follow-Up: _____] : a [unfilled] follow-up visit

## 2024-05-21 NOTE — ASSESSMENT
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  She does not have any left pulsatile tinnitus, vision complaints or positional headaches.  I recommend annual eye exams to make sure she doesn't have papilledema.  Will continue routine dural AVF follow up.    Plan: Annual Eye Exams to r/o Papilledema MRA Head w/wo TRICKS Protocol in 5/2025 Follow Up with Me After Imaging

## 2025-05-22 ENCOUNTER — APPOINTMENT (OUTPATIENT)
Dept: NEUROSURGERY | Facility: CLINIC | Age: 45
End: 2025-05-22
Payer: COMMERCIAL

## 2025-05-22 DIAGNOSIS — I67.1 CEREBRAL ANEURYSM, NONRUPTURED: ICD-10-CM

## 2025-05-22 PROCEDURE — 99213 OFFICE O/P EST LOW 20 MIN: CPT | Mod: 95
